# Patient Record
Sex: FEMALE | URBAN - METROPOLITAN AREA
[De-identification: names, ages, dates, MRNs, and addresses within clinical notes are randomized per-mention and may not be internally consistent; named-entity substitution may affect disease eponyms.]

---

## 2023-03-30 ENCOUNTER — APPOINTMENT (RX ONLY)
Dept: URBAN - METROPOLITAN AREA CLINIC 90 | Facility: CLINIC | Age: 48
Setting detail: DERMATOLOGY
End: 2023-03-30

## 2023-03-30 DIAGNOSIS — L72.0 EPIDERMAL CYST: ICD-10-CM

## 2023-03-30 DIAGNOSIS — L40.0 PSORIASIS VULGARIS: ICD-10-CM | Status: INADEQUATELY CONTROLLED

## 2023-03-30 PROCEDURE — ? COUNSELING

## 2023-03-30 PROCEDURE — ? FULL BODY SKIN EXAM - DECLINED

## 2023-03-30 PROCEDURE — ? PHOTO-DOCUMENTATION

## 2023-03-30 PROCEDURE — ? DEFER

## 2023-03-30 PROCEDURE — ? PRESCRIPTION

## 2023-03-30 PROCEDURE — 99204 OFFICE O/P NEW MOD 45 MIN: CPT

## 2023-03-30 PROCEDURE — ? TREATMENT REGIMEN

## 2023-03-30 RX ORDER — CLOBETASOL PROPIONATE 0.5 MG/ML
SOLUTION TOPICAL QHS
Qty: 50 | Refills: 8 | Status: ERX | COMMUNITY
Start: 2023-03-30

## 2023-03-30 RX ADMIN — CLOBETASOL PROPIONATE: 0.5 SOLUTION TOPICAL at 00:00

## 2023-03-30 ASSESSMENT — LOCATION ZONE DERM
LOCATION ZONE: SCALP
LOCATION ZONE: TRUNK

## 2023-03-30 ASSESSMENT — LOCATION SIMPLE DESCRIPTION DERM
LOCATION SIMPLE: POSTERIOR SCALP
LOCATION SIMPLE: RIGHT UPPER BACK

## 2023-03-30 ASSESSMENT — LOCATION DETAILED DESCRIPTION DERM
LOCATION DETAILED: RIGHT SUPERIOR MEDIAL UPPER BACK
LOCATION DETAILED: MID-OCCIPITAL SCALP

## 2023-03-30 NOTE — PROCEDURE: DEFER
X Size Of Lesion In Cm (Optional): 0
Size Of Lesion In Cm (Optional): 1.5
Introduction Text (Please End With A Colon): The following procedure was deferred:
Detail Level: Zone

## 2024-07-17 ENCOUNTER — APPOINTMENT (RX ONLY)
Dept: URBAN - METROPOLITAN AREA CLINIC 90 | Facility: CLINIC | Age: 49
Setting detail: DERMATOLOGY
End: 2024-07-17

## 2024-07-17 DIAGNOSIS — L72.0 EPIDERMAL CYST: ICD-10-CM

## 2024-07-17 PROCEDURE — ? COUNSELING

## 2024-07-17 PROCEDURE — ? PHOTO-DOCUMENTATION

## 2024-07-17 PROCEDURE — ? DIAGNOSIS COMMENT

## 2024-07-17 PROCEDURE — 99212 OFFICE O/P EST SF 10 MIN: CPT

## 2024-07-17 PROCEDURE — ? DEFER

## 2024-07-17 ASSESSMENT — LOCATION ZONE DERM: LOCATION ZONE: TRUNK

## 2024-07-17 ASSESSMENT — LOCATION SIMPLE DESCRIPTION DERM: LOCATION SIMPLE: RIGHT UPPER BACK

## 2024-07-17 ASSESSMENT — LOCATION DETAILED DESCRIPTION DERM: LOCATION DETAILED: RIGHT SUPERIOR UPPER BACK

## 2024-07-17 NOTE — PROCEDURE: DEFER
Size Of Lesion In Cm (Optional): 1.8
Detail Level: Detailed
Reason To Defer Override: Needs excision time slot
X Size Of Lesion In Cm (Optional): 1.9
Procedure To Be Performed At Next Visit: Excision
Instructions (Optional): To be performed by Dr. Kauffman due to mobility area
Introduction Text (Please End With A Colon): The following procedure was deferred: